# Patient Record
Sex: MALE | Race: WHITE | NOT HISPANIC OR LATINO | URBAN - METROPOLITAN AREA
[De-identification: names, ages, dates, MRNs, and addresses within clinical notes are randomized per-mention and may not be internally consistent; named-entity substitution may affect disease eponyms.]

---

## 2021-02-13 ENCOUNTER — TELEPHONE (OUTPATIENT)
Dept: FAMILY MEDICINE | Facility: CLINIC | Age: 36
End: 2021-02-13

## 2021-02-15 NOTE — TELEPHONE ENCOUNTER
Refill Request (ESCITALOPRAM 20 MG TABKLET-)    Alyssa Abarca routed conversation to [facility] 2 days ago      [id number] - [place], [place] - [address] [phone]  Alyssa Abarca      Never been seen- at [place]. Tricia Angelo RN